# Patient Record
Sex: MALE | Race: BLACK OR AFRICAN AMERICAN | NOT HISPANIC OR LATINO | URBAN - METROPOLITAN AREA
[De-identification: names, ages, dates, MRNs, and addresses within clinical notes are randomized per-mention and may not be internally consistent; named-entity substitution may affect disease eponyms.]

---

## 2017-06-15 ENCOUNTER — APPOINTMENT (OUTPATIENT)
Dept: RADIOLOGY | Facility: MEDICAL CENTER | Age: 27
End: 2017-06-15
Attending: EMERGENCY MEDICINE
Payer: MEDICAID

## 2017-06-15 ENCOUNTER — HOSPITAL ENCOUNTER (EMERGENCY)
Facility: MEDICAL CENTER | Age: 27
End: 2017-06-15
Attending: EMERGENCY MEDICINE
Payer: MEDICAID

## 2017-06-15 VITALS
SYSTOLIC BLOOD PRESSURE: 125 MMHG | RESPIRATION RATE: 16 BRPM | BODY MASS INDEX: 26.61 KG/M2 | WEIGHT: 230 LBS | OXYGEN SATURATION: 98 % | TEMPERATURE: 98.2 F | HEIGHT: 78 IN | HEART RATE: 105 BPM | DIASTOLIC BLOOD PRESSURE: 60 MMHG

## 2017-06-15 DIAGNOSIS — G89.29 CHRONIC BILATERAL LOW BACK PAIN WITH BILATERAL SCIATICA: ICD-10-CM

## 2017-06-15 DIAGNOSIS — M54.41 CHRONIC BILATERAL LOW BACK PAIN WITH BILATERAL SCIATICA: ICD-10-CM

## 2017-06-15 DIAGNOSIS — M54.42 CHRONIC BILATERAL LOW BACK PAIN WITH BILATERAL SCIATICA: ICD-10-CM

## 2017-06-15 PROCEDURE — 700102 HCHG RX REV CODE 250 W/ 637 OVERRIDE(OP): Performed by: EMERGENCY MEDICINE

## 2017-06-15 PROCEDURE — 99284 EMERGENCY DEPT VISIT MOD MDM: CPT

## 2017-06-15 PROCEDURE — A9270 NON-COVERED ITEM OR SERVICE: HCPCS | Performed by: EMERGENCY MEDICINE

## 2017-06-15 PROCEDURE — 72100 X-RAY EXAM L-S SPINE 2/3 VWS: CPT

## 2017-06-15 PROCEDURE — 700111 HCHG RX REV CODE 636 W/ 250 OVERRIDE (IP): Performed by: EMERGENCY MEDICINE

## 2017-06-15 PROCEDURE — 96372 THER/PROPH/DIAG INJ SC/IM: CPT

## 2017-06-15 RX ORDER — METHOCARBAMOL 500 MG/1
1000 TABLET, FILM COATED ORAL ONCE
Status: COMPLETED | OUTPATIENT
Start: 2017-06-15 | End: 2017-06-15

## 2017-06-15 RX ORDER — KETOROLAC TROMETHAMINE 30 MG/ML
30 INJECTION, SOLUTION INTRAMUSCULAR; INTRAVENOUS ONCE
Status: COMPLETED | OUTPATIENT
Start: 2017-06-15 | End: 2017-06-15

## 2017-06-15 RX ORDER — IBUPROFEN 600 MG/1
600 TABLET ORAL EVERY 6 HOURS PRN
Qty: 20 TAB | Refills: 0 | Status: SHIPPED | OUTPATIENT
Start: 2017-06-15

## 2017-06-15 RX ORDER — METHOCARBAMOL 500 MG/1
1000 TABLET, FILM COATED ORAL 3 TIMES DAILY PRN
Qty: 20 TAB | Refills: 0 | Status: SHIPPED | OUTPATIENT
Start: 2017-06-15

## 2017-06-15 RX ADMIN — METHOCARBAMOL 1000 MG: 500 TABLET ORAL at 21:13

## 2017-06-15 RX ADMIN — KETOROLAC TROMETHAMINE 30 MG: 30 INJECTION, SOLUTION INTRAMUSCULAR at 21:14

## 2017-06-15 ASSESSMENT — LIFESTYLE VARIABLES
ON A TYPICAL DAY WHEN YOU DRINK ALCOHOL HOW MANY DRINKS DO YOU HAVE: 2
HAVE YOU EVER FELT YOU SHOULD CUT DOWN ON YOUR DRINKING: NO
CONSUMPTION TOTAL: NEGATIVE
EVER FELT BAD OR GUILTY ABOUT YOUR DRINKING: NO
HOW MANY TIMES IN THE PAST YEAR HAVE YOU HAD 5 OR MORE DRINKS IN A DAY: 0
DO YOU DRINK ALCOHOL: YES
HAVE PEOPLE ANNOYED YOU BY CRITICIZING YOUR DRINKING: NO
TOTAL SCORE: 0
AVERAGE NUMBER OF DAYS PER WEEK YOU HAVE A DRINK CONTAINING ALCOHOL: 6
TOTAL SCORE: 0
EVER HAD A DRINK FIRST THING IN THE MORNING TO STEADY YOUR NERVES TO GET RID OF A HANGOVER: NO
TOTAL SCORE: 0

## 2017-06-15 ASSESSMENT — PAIN SCALES - GENERAL
PAINLEVEL_OUTOF10: 8
PAINLEVEL_OUTOF10: 6

## 2017-06-15 NOTE — ED AVS SNAPSHOT
[employer] Access Code: [email or web address]  Expires: 7/15/2017 10:52 PM    Your email address is not on file at [facility].  Email Addresses are required for you to sign up for [employer], please contact [phone] to verify your personal information and to provide your email address prior to attempting to register for [employer].    Jovan Kam  [address] [place] [address]    [employer]  A secure, online tool to manage your health information     [employer]’s [employer]® is a secure, online tool that connects you to your personalized health information from the privacy of your home -- day or night - making it very easy for you to manage your healthcare. Once the activation process is completed, you can even access your medical information using the [employer] vishal, which is available for free in the Apple Vishal store or Google Play store.     To learn more about [employer], visit [email or web address]    There are two levels of access available (as shown below):   My Chart Features  [facility] Primary Care Doctor [facility]  Specialists [facility]  Urgent  Care Non-[facility] Primary Care Doctor   Email your healthcare team securely and privately 24/7 X X X    Manage appointments: schedule your next appointment; view details of past/upcoming appointments X      Request prescription refills. X      View recent personal medical records, including lab and immunizations X X X X   View health record, including health history, allergies, medications X X X X   Read reports about your outpatient visits, procedures, consult and ER notes X X X X   See your discharge summary, which is a recap of your hospital and/or ER visit that includes your diagnosis, lab results, and care plan X X  X     How to register for [employer]:  Once your e-mail address has been verified, follow the following steps to sign up for [employer].     1. Go to  [email or web address]  2. Click on the Sign Up Now box, which takes you to the New Member Sign Up page. You will need  to provide the following information:  a. Enter your [employer] Access Code exactly as it appears at the top of this page. (You will not need to use this code after you’ve completed the sign-up process. If you do not sign up before the expiration date, you must request a new code.)   b. Enter your date of birth.   c. Enter your home email address.   d. Click Submit, and follow the next screen’s instructions.  3. Create a FLENS ID. This will be your [employer]ENS login ID and cannot be changed, so think of one that is secure and easy to remember.  4. Create a [employer]ENS password. You can change your password at any time.  5. Enter your Password Reset Question and Answer. This can be used at a later time if you forget your password.   6. Enter your e-mail address. This allows you to receive e-mail notifications when new information is available in FLENS.  7. Click Sign Up. You can now view your health information.    For assistance activating your [employer] account, call [phone]

## 2017-06-15 NOTE — ED AVS SNAPSHOT
Home Care Instructions                                                                                                                Jovan Kam   MRN: [id number]    Department:  [facility], Emergency Dept   Date of Visit:  6/15/2017            [facility], Emergency Dept    [address] [place] [id number]    Phone:  [phone]      You were seen by     Bushra Perrin M.D.      Your Diagnosis Was     Chronic bilateral low back pain with bilateral sciatica     M54.42, M54.41, G89.29       These are the medications you received during your hospitalization from 06/15/2017 1944 to 06/15/2017 2252     Date/Time Order Dose Route Action    06/15/2017 2114 ketorolac (TORADOL) injection 30 mg 30 mg Intramuscular Given    06/15/2017 2113 methocarbamol (ROBAXIN) tablet 1,000 mg 1,000 mg Oral Given      Follow-up Information     1. Go to [facility], Emergency Dept.    Specialty:  Emergency Medicine    Why:  If symptoms worsen. Please call your primary care physician in [place] for a follow-up appointment    Contact information    [address] [place] [id number] [phone]      Medication Information     Review all of your home medications and newly ordered medications with your primary doctor and/or pharmacist as soon as possible. Follow medication instructions as directed by your doctor and/or pharmacist.     Please keep your complete medication list with you and share with your physician. Update the information when medications are discontinued, doses are changed, or new medications (including over-the-counter products) are added; and carry medication information at all times in the event of emergency situations.               Medication List      START taking these medications        Instructions    Morning Afternoon Evening Bedtime    ibuprofen 600 MG Tabs   Commonly known as:  MOTRIN        Take 1 Tab by mouth every 6 hours as needed.     Dose:  600 mg                        methocarbamol 500 MG Tabs   Last time this was given:  1,000 mg on 6/15/2017  9:13 PM   Commonly known as:  ROBAXIN        Take 2 Tabs by mouth 3 times a day as needed.   Dose:  1000 mg                             Where to Get Your Medications      You can get these medications from any pharmacy     Bring a paper prescription for each of these medications    - ibuprofen 600 MG Tabs  - methocarbamol 500 MG Tabs            Procedures and tests performed during your visit     DX-LUMBAR SPINE-2 OR 3 VIEWS        Discharge Instructions       Please follow-up with your primary care physician for complete recheck. As we discussed, you should contact a primary care clinic that accepts your insurance soon so that you may schedule a follow-up appointment for when you return home. Return to the emergency department if he develops numbness or tingling in the legs, incontinence, weakness, or any of the symptoms that we discussed that could indicate injury to the spinal cord.    Back Pain, Adult  Back pain is very common in adults. The cause of back pain is rarely dangerous and the pain often gets better over time. The cause of your back pain may not be known. Some common causes of back pain include:  · Strain of the muscles or ligaments supporting the spine.  · Wear and tear (degeneration) of the spinal disks.  · Arthritis.  · Direct injury to the back.  For many people, back pain may return. Since back pain is rarely dangerous, most people can learn to manage this condition on their own.  HOME CARE INSTRUCTIONS  Watch your back pain for any changes. The following actions may help to lessen any discomfort you are feeling:  · Remain active. It is stressful on your back to sit or  one place for long periods of time. Do not sit, drive, or  one place for more than 30 minutes at a time. Take short walks on even surfaces as soon as you are able. Try to increase the length of time  you walk each day.  · Exercise regularly as directed by your health care provider. Exercise helps your back heal faster. It also helps avoid future injury by keeping your muscles strong and flexible.  · Do not stay in bed. Resting more than 1-2 days can delay your recovery.  · Pay attention to your body when you bend and lift. The most comfortable positions are those that put less stress on your recovering back. Always use proper lifting techniques, including:  ¨ Bending your knees.  ¨ Keeping the load close to your body.  ¨ Avoiding twisting.  · Find a comfortable position to sleep. Use a firm mattress and lie on your side with your knees slightly bent. If you lie on your back, put a pillow under your knees.  · Avoid feeling anxious or stressed. Stress increases muscle tension and can worsen back pain. It is important to recognize when you are anxious or stressed and learn ways to manage it, such as with exercise.  · Take medicines only as directed by your health care provider. Over-the-counter medicines to reduce pain and inflammation are often the most helpful. Your health care provider may prescribe muscle relaxant drugs. These medicines help dull your pain so you can more quickly return to your normal activities and healthy exercise.  · Apply ice to the injured area:  ¨ Put ice in a plastic bag.  ¨ Place a towel between your skin and the bag.  ¨ Leave the ice on for 20 minutes, 2-3 times a day for the first 2-3 days. After that, ice and heat may be alternated to reduce pain and spasms.  · Maintain a healthy weight. Excess weight puts extra stress on your back and makes it difficult to maintain good posture.  SEEK MEDICAL CARE IF:  · You have pain that is not relieved with rest or medicine.  · You have increasing pain going down into the legs or buttocks.  · You have pain that does not improve in one week.  · You have night pain.  · You lose weight.  · You have a fever or chills.  SEEK IMMEDIATE MEDICAL CARE IF:    · You develop new bowel or bladder control problems.  · You have unusual weakness or numbness in your arms or legs.  · You develop nausea or vomiting.  · You develop abdominal pain.  · You feel faint.     This information is not intended to replace advice given to you by your health care provider. Make sure you discuss any questions you have with your health care provider.     Document Released: 12/18/2006 Document Revised: 01/08/2016 Document Reviewed: 04/21/2015  [employer] Interactive Patient Education ©2016 [employer]            Patient Information     Patient Information    Following emergency treatment: all patient requiring follow-up care must return either to a private physician or a clinic if your condition worsens before you are able to obtain further medical attention, please return to the emergency room.     Billing Information    At [facility], we work to make the billing process streamlined for our patients.  Our Representatives are here to answer any questions you may have regarding your hospital bill.  If you have insurance coverage and have supplied your insurance information to us, we will submit a claim to your insurer on your behalf.  Should you have any questions regarding your bill, we can be reached online or by phone as follows:  Online: You are able pay your bills online or live chat with our representatives about any billing questions you may have. We are here to help Monday - Friday from 8:00am to 7:30pm and 9:00am - 12:00pm on Saturdays.  Please visit [email or web address]  for more information.   Phone:  [phone] or [phone]    Please note that your emergency physician, surgeon, pathologist, radiologist, anesthesiologist, and other specialists are not employed by [facility] and will therefore bill separately for their services.  Please contact them directly for any questions concerning their bills at the numbers below:     Emergency Physician  Services:  [phone]  [facility]:  [phone]  [facility]:  [phone]  [facility]:  [phone]    1. Your final bill may vary from the amount quoted upon discharge if all procedures are not complete at that time, or if your doctor has additional procedures of which we are not aware. You will receive an additional bill if you return to the Emergency Department at [facility] for suture removal regardless of the facility of which the sutures were placed.     2. Please arrange for settlement of this account at the emergency registration.    3. All self-pay accounts are due in full at the time of treatment.  If you are unable to meet this obligation then payment is expected within 4-5 days.     4. If you have had radiology studies (CT, X-ray, Ultrasound, MRI), you have received a preliminary result during your emergency department visit. Please contact the radiology department [phone] to receive a copy of your final result. Please discuss the Final result with your primary physician or with the follow up physician provided.     Crisis Hotline:  [place] Crisis Hotline:  6-068-LCUQZMW or 1-377.461.4715  [place] Crisis Hotline:    1-482.663.1151 or 246-836-1814         ED Discharge Follow Up Questions    1. In order to provide you with very good care, we would like to follow up with a phone call in the next few days.  May we have your permission to contact you?     YES /  NO    2. What is the best phone number to call you? (       )_____-__________    3. What is the best time to call you?      Morning  /  Afternoon  /  Evening                   Patient Signature:  ____________________________________________________________    Date:  ____________________________________________________________

## 2017-06-15 NOTE — ED AVS SNAPSHOT
6/15/2017    Jovan Kam  [address] [place] [address]    Dear Jovan:    [facility] wants to ensure your discharge home is safe and you or your loved ones have had all of your questions answered regarding your care after you leave the hospital.    Below is a list of resources and contact information should you have any questions regarding your hospital stay, follow-up instructions, or active medical symptoms.    Questions or Concerns Regarding… Contact   Medical Questions Related to Your Discharge  (7 days a week, 8am-5pm) Contact a Nurse Care Coordinator   [phone]   Medical Questions Not Related to Your Discharge  (24 hours a day / 7 days a week)  Contact the Nurse Health Line   [phone]    Medications or Discharge Instructions Refer to your discharge packet   or contact your [facility] Primary Care Provider   [phone]   Follow-up Appointment(s) Schedule your appointment via [employer]   or contact Scheduling [phone]   Billing Review your statement via [employer]  or contact Billing [phone]   Medical Records Review your records via [employer]   or contact Medical Records [phone]     You may receive a telephone call within two days of discharge. This call is to make certain you understand your discharge instructions and have the opportunity to have any questions answered. You can also easily access your medical information, test results and upcoming appointments via the [employer] free online health management tool. You can learn more and sign up at [employer] For assistance setting up your [employer] account, please call [phone].    Once again, we want to ensure your discharge home is safe and that you have a clear understanding of any next steps in your care. If you have any questions or concerns, please do not hesitate to contact us, we are here for you. Thank you for choosing [facility] for your healthcare needs.    Sincerely,    Your [facility] Healthcare Team

## 2017-06-16 NOTE — ED NOTES
Pt has hx of broken back with compression.  Sitting in semi fowlers position at present complaining of pain to lower back that is sharp and shooting

## 2017-06-16 NOTE — ED NOTES
Jovan Kam  27 y.o.  male  Chief Complaint   Patient presents with   • Low Back Pain     Present to triage c/o low back pain since Monday. Worst today. Hx lower back fx. Denies trauma. Pain radiates to lower legs. 3 tabs of tylenol with no relief.

## 2017-06-16 NOTE — DISCHARGE INSTRUCTIONS
Please follow-up with your primary care physician for complete recheck. As we discussed, you should contact a primary care clinic that accepts your insurance soon so that you may schedule a follow-up appointment for when you return home. Return to the emergency department if he develops numbness or tingling in the legs, incontinence, weakness, or any of the symptoms that we discussed that could indicate injury to the spinal cord.    Back Pain, Adult  Back pain is very common in adults. The cause of back pain is rarely dangerous and the pain often gets better over time. The cause of your back pain may not be known. Some common causes of back pain include:  · Strain of the muscles or ligaments supporting the spine.  · Wear and tear (degeneration) of the spinal disks.  · Arthritis.  · Direct injury to the back.  For many people, back pain may return. Since back pain is rarely dangerous, most people can learn to manage this condition on their own.  HOME CARE INSTRUCTIONS  Watch your back pain for any changes. The following actions may help to lessen any discomfort you are feeling:  · Remain active. It is stressful on your back to sit or  one place for long periods of time. Do not sit, drive, or  one place for more than 30 minutes at a time. Take short walks on even surfaces as soon as you are able. Try to increase the length of time you walk each day.  · Exercise regularly as directed by your health care provider. Exercise helps your back heal faster. It also helps avoid future injury by keeping your muscles strong and flexible.  · Do not stay in bed. Resting more than 1-2 days can delay your recovery.  · Pay attention to your body when you bend and lift. The most comfortable positions are those that put less stress on your recovering back. Always use proper lifting techniques, including:  ¨ Bending your knees.  ¨ Keeping the load close to your body.  ¨ Avoiding twisting.  · Find a comfortable position  to sleep. Use a firm mattress and lie on your side with your knees slightly bent. If you lie on your back, put a pillow under your knees.  · Avoid feeling anxious or stressed. Stress increases muscle tension and can worsen back pain. It is important to recognize when you are anxious or stressed and learn ways to manage it, such as with exercise.  · Take medicines only as directed by your health care provider. Over-the-counter medicines to reduce pain and inflammation are often the most helpful. Your health care provider may prescribe muscle relaxant drugs. These medicines help dull your pain so you can more quickly return to your normal activities and healthy exercise.  · Apply ice to the injured area:  ¨ Put ice in a plastic bag.  ¨ Place a towel between your skin and the bag.  ¨ Leave the ice on for 20 minutes, 2-3 times a day for the first 2-3 days. After that, ice and heat may be alternated to reduce pain and spasms.  · Maintain a healthy weight. Excess weight puts extra stress on your back and makes it difficult to maintain good posture.  SEEK MEDICAL CARE IF:  · You have pain that is not relieved with rest or medicine.  · You have increasing pain going down into the legs or buttocks.  · You have pain that does not improve in one week.  · You have night pain.  · You lose weight.  · You have a fever or chills.  SEEK IMMEDIATE MEDICAL CARE IF:   · You develop new bowel or bladder control problems.  · You have unusual weakness or numbness in your arms or legs.  · You develop nausea or vomiting.  · You develop abdominal pain.  · You feel faint.     This information is not intended to replace advice given to you by your health care provider. Make sure you discuss any questions you have with your health care provider.     Document Released: 12/18/2006 Document Revised: 01/08/2016 Document Reviewed: 04/21/2015  [employer] Patient Education ©2016 [employer]

## 2017-06-16 NOTE — ED PROVIDER NOTES
"      ED Provider Note    Scribed for Bushra Perrin M.D. by Eric Mead. 6/15/2017, 8:52 PM.    Primary Care Provider: No primary care provider on file.  Means of arrival: Walk In  History obtained from: Patient  History limited by: None     CHIEF COMPLAINT  Chief Complaint   Patient presents with   • Low Back Pain     HPI  Jovan Kam is a 27 y.o. male who presents to the Emergency Department complaining of lower back pain. The patient, who has a history of chronic lower back pain secondary to back fracture. The patient injured his lower back in the beginning of April, where he had an X Ray showing flattened disc. The patient patient's lower back pain has progressively worsened since April, with radiation of \"shooting\" pain into bilateral lower extremities. Patient's lower back pain is exacerbated by bending over, walking and weightbearing. He has medicated his lower back pain with marijuana CBD tablets without improvement of his pain. The patient has not follow up with spine specialist. Patient is visiting [facility], he currently resides in [facility]. The patient denies any fever, neck pain, numbness, weakness. Denies incontinent, denies symptoms of saddle anesthesia that has numbness in the groin or numbness when wiping.    States he has been traveling, was seen for this pain at an emergency department in [facility], was also seen at [facility] in [place] where he was evaluated with x-rays, he states they showed an injury to the desk but is uncertain of the exact diagnosis. He was referred at those facilities to primary care physicians for outpatient MRI, however is not any one place long enough to receive any type of follow-up.      REVIEW OF SYSTEMS  Pertinent positives include low back pain, bilateral leg pain. Pertinent negatives include no fever, neck pain, numbness, weakness. See HPI for further details.     E.    PAST MEDICAL HISTORY  History of low back pain.     SOCIAL " "HISTORY       SURGICAL HISTORY  patient denies any surgical history     CURRENT MEDICATIONS  Home Medications     Reviewed by Elinor Cassidy R.N. (Registered Nurse) on 06/15/17 at 2107  Med List Status: Not Addressed    Medication Last Dose Status          Patient Thierry Taking any Medications                      ALLERGIES  No Known Allergies    PHYSICAL EXAM  VITAL SIGNS: /85 mmHg  Pulse 120  Temp(Src) 36.8 °C (98.2 °F)  Resp 18  Ht 1.981 m (6' 6\")  Wt 104.327 kg (230 lb)  BMI 26.58 kg/m2  SpO2 98%  Constitutional: Alert  HENT: Normocephalic, atraumatic  Thorax & Lungs: Easy unlabored respirations  Skin: Visualized skin is  Dry, No erythema, No rash, multiple tattoos    Extremities:   No edema, No asymmetry  Back: Diffuse lower back tenderness to palpation. Tenderness is equal along paraspinal musculature and midline.   Neurologic: Alert, Grossly non-focal. 2+ patellar reflexes bilaterally. Normal gait that reproduces pain. 5 out of 5 lower extremity strength with hip flexion and knee extension, sensation intact throughout bilateral lower extremities.  Psychiatric: Affect and Mood normal    LABS  Labs Reviewed - No data to display  All labs reviewed by me.    Radiology results show:   DX-LUMBAR SPINE-2 OR 3 VIEWS   Final Result         1.  Mild anterior wedging of the T12 vertebral body, compatible with age indeterminant compression injury.   2.  Degenerative disc disease at L3/L4 appears to result in component of neural foraminal stenosis.        COURSE & MEDICAL DECISION MAKING  Nursing notes, VS, PMSFHx reviewed in chart.    8:52 PM - Patient seen and examined at bedside. Patient will be treated with Toradol 30 mg IV, Robaxin ,000 mg PO. Ordered Lumbar Spine X Ray to evaluate his symptoms.     10:39 PM Recheck: Patient re-evaluated at [facility]. The patient was updated of his Lumbar Spine X Ray results. He was prescribed robaxin and ibuprofen for treatment following discharge.     Decision " Making:  This is a 27 y.o. year old who presents with complaint of recurrent low back pain that has been intermittent and persistent for the past several years after a traumatic back injury. His complaint is entirely of pain, he has no symptoms concerning for cauda equina, no weakness, no incontinence, no paresthesias, no reported saddle anesthesia. He has normal patellar reflexes, 5 out of 5 lower extremity strength, normal gait. No deficits on motor exam the neurologic exam.    He was treated with Toradol intramuscularly as well as Robaxin in the emergency department with significant alleviation of his pain. Plain film demonstrates T12 compression injury consistent with the patient's history, degenerative disc disease noted at L3/L4 which may be the disc disease he was referring to when he described his previous x-rays. I do believe he ultimately requires follow-up and possibly MRI, however I do not believe he emergently requires that this evening. He is working to return home to [place]. I suggested that he call soon to set up an appointment so that he does not have a long wait when he returns home. Discussed signs and symptoms of cauda equina, these were provided in verbal and written format. He will return to emergency department immediately if he develops any new or worsening symptoms occluding numbness, incontinence, saddle anesthesia, weakness or worsening pain.    Discharged on Robaxin and ibuprofen in stable condition.    The patient is referred to a primary physician for blood pressure management, diabetic screening, and for all other preventative health concerns.    DISPOSITION:  Patient will be discharged home in stable condition.    FOLLOW UP:  [facility], Emergency Dept  [facility] [facility] [address] [phone]  Go to  If symptoms worsen. Please call your primary care physician in [place] for a follow-up appointment      OUTPATIENT MEDICATIONS:  Discharge  Medication List as of 6/15/2017 10:52 PM      START taking these medications    Details   methocarbamol (ROBAXIN) 500 MG Tab Take 2 Tabs by mouth 3 times a day as needed., Disp-20 Tab, R-0, Print Rx Paper      ibuprofen (MOTRIN) 600 MG Tab Take 1 Tab by mouth every 6 hours as needed., Disp-20 Tab, R-0, Print Rx Paper           FINAL IMPRESSION  1. Chronic bilateral low back pain with bilateral sciatica          IEric (Scribe), am scribing for, and in the presence of, Bushra Perrin M.D..    Electronically signed by: Eric Mead (Scribe), 6/15/2017    IBushra M.D. personally performed the services described in this documentation, as scribed by Eric Mead in my presence, and it is both accurate and complete.    The note accurately reflects work and decisions made by me.  Bushra Perrin  6/15/2017  11:16 PM

## 2017-06-16 NOTE — ED NOTES
Pt ambulatory with steady gait, pt verbalized understanding of poc and discharge , no questions at this time.   VSS